# Patient Record
Sex: MALE | Race: WHITE | NOT HISPANIC OR LATINO | URBAN - METROPOLITAN AREA
[De-identification: names, ages, dates, MRNs, and addresses within clinical notes are randomized per-mention and may not be internally consistent; named-entity substitution may affect disease eponyms.]

---

## 2018-05-18 ENCOUNTER — EMERGENCY (EMERGENCY)
Facility: HOSPITAL | Age: 38
LOS: 1 days | Discharge: HOME | End: 2018-05-18
Admitting: EMERGENCY MEDICINE
Payer: MEDICAID

## 2018-05-18 VITALS
WEIGHT: 160.06 LBS | HEART RATE: 105 BPM | SYSTOLIC BLOOD PRESSURE: 144 MMHG | DIASTOLIC BLOOD PRESSURE: 82 MMHG | RESPIRATION RATE: 18 BRPM | OXYGEN SATURATION: 97 % | TEMPERATURE: 98 F

## 2018-05-18 VITALS
SYSTOLIC BLOOD PRESSURE: 136 MMHG | DIASTOLIC BLOOD PRESSURE: 82 MMHG | OXYGEN SATURATION: 97 % | HEART RATE: 98 BPM | RESPIRATION RATE: 18 BRPM
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
## 2018-05-18 DIAGNOSIS — Z79.1 LONG TERM (CURRENT) USE OF NON-STEROIDAL ANTI-INFLAMMATORIES (NSAID): ICD-10-CM
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
## 2018-05-18 DIAGNOSIS — M54.5 LOW BACK PAIN: ICD-10-CM
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
## 2018-05-18 DIAGNOSIS — S39.012A STRAIN OF MUSCLE, FASCIA AND TENDON OF LOWER BACK, INITIAL ENCOUNTER: ICD-10-CM
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
## 2018-05-18 LAB
APPEARANCE UR: CLEAR — SIGNIFICANT CHANGE UP
BILIRUB UR-MCNC: NEGATIVE — SIGNIFICANT CHANGE UP
COLOR SPEC: YELLOW — SIGNIFICANT CHANGE UP
DIFF PNL FLD: NEGATIVE — SIGNIFICANT CHANGE UP
GLUCOSE UR QL: NEGATIVE — SIGNIFICANT CHANGE UP
KETONES UR-MCNC: NEGATIVE — SIGNIFICANT CHANGE UP
LEUKOCYTE ESTERASE UR-ACNC: NEGATIVE — SIGNIFICANT CHANGE UP
NITRITE UR-MCNC: NEGATIVE — SIGNIFICANT CHANGE UP
PH UR: 7 — SIGNIFICANT CHANGE UP (ref 5–8)
PROT UR-MCNC: NEGATIVE MG/DL — SIGNIFICANT CHANGE UP
SP GR SPEC: 1.02 — SIGNIFICANT CHANGE UP (ref 1–1.03)
UROBILINOGEN FLD QL: 0.2 E.U./DL — SIGNIFICANT CHANGE UP

## 2018-05-18 PROCEDURE — 72100 X-RAY EXAM L-S SPINE 2/3 VWS: CPT | Mod: 26

## 2018-05-18 PROCEDURE — 99283 EMERGENCY DEPT VISIT LOW MDM: CPT | Mod: 25

## 2018-05-18 RX ORDER — KETOROLAC TROMETHAMINE 30 MG/ML
60 SYRINGE (ML) INJECTION ONCE
Qty: 0 | Refills: 0 | Status: DISCONTINUED | OUTPATIENT
Start: 2018-05-18 | End: 2018-05-18

## 2018-05-18 RX ORDER — DIAZEPAM 5 MG
5 TABLET ORAL ONCE
Qty: 0 | Refills: 0 | Status: DISCONTINUED | OUTPATIENT
Start: 2018-05-18 | End: 2018-05-18

## 2018-05-18 RX ORDER — IBUPROFEN 200 MG
1 TABLET ORAL
Qty: 20 | Refills: 0 | OUTPATIENT
Start: 2018-05-18 | End: 2018-06-16

## 2018-05-18 RX ADMIN — Medication 5 MILLIGRAM(S): at 20:54

## 2018-05-18 RX ADMIN — Medication 60 MILLIGRAM(S): at 20:54

## 2018-05-18 NOTE — ED ADULT NURSE NOTE - OBJECTIVE STATEMENT
pt presents to ED with back pain after lifting object. feels like a pulled muscle. denies CP, SOB, numbness/tingling. pt ambulating well.

## 2018-05-18 NOTE — ED PROVIDER NOTE - OBJECTIVE STATEMENT
39 yo M with no known PMHx, presenting c/o back pain since this morning.  pt was reaching for something while bending over and noted sudden onset of sharp pain in the lower back region, couldn't move for a few mins but slowly recovered and noted improvement in pain. Now with tightness sensation and feeling "unstable with something grinding against each other sensation." Denies fever, chills, dysuria, hematuria, flank pain, change in urinary/bowel function, numbness, tingling, focal weakness, abdominal pain, N/V/D/C, melena, hematochezia, CP, SOB, palpitations, diaphoresis, dizziness, HA, cough, rash, trauma, and fall.

## 2018-05-18 NOTE — ED PROVIDER NOTE - MEDICAL DECISION MAKING DETAILS
AFVSS at time of d/c, pt non-toxic appearing, results, ddx, and f/u plans discussed with pt at bedside, d/c'd home to f/u with PMD, strict return precautions discussed, prompt return to ER for any worsening or new sx, pt verbalized understanding.

## 2018-05-18 NOTE — ED ADULT NURSE NOTE - DISCHARGE TEACHING
Pt instructed to follow up with pcp and to do stretches as instructed my MD. Pt stable for dc at this time.

## 2018-05-18 NOTE — ED PROVIDER NOTE - PHYSICAL EXAMINATION
Vital Signs - nursing notes reviewed and confirmed  Gen - WDWN M, NAD, comfortable, speaking in full sentences   Skin - warm, dry, intact  HEENT - AT/NC, PERRL, EOMI, no conjunctival injection, moist oral mucosa, o/p clear with no erythema, edema, or exudate, uvula midline, airway patent, neck supple and NT, FROM  CV - S1S2, R/R/R  Resp - respiration non-labored, CTAB, symmetric bs b/l, no r/r/w  GI - NABS, soft, ND, NT, no rebound or guarding, no CVAT b/l   MS - w/w/p, no c/c/e, calves supple and NT, distal pulses symmetric b/l, mild paraspinal tenderness in R lumbar region, no midline tenderness, step off, crepitus, erythema, edema, ecchymosis, or deformity, neg straight leg test.   Neuro - AxOx3, no focal neuro deficits, CN II-XII grossly intact, cerebellar function intact, ambulatory without gait disturbance

## 2018-05-18 NOTE — ED ADULT NURSE NOTE - CHPI ED SYMPTOMS NEG
no anorexia/no difficulty bearing weight/no constipation/no tingling/no neck tenderness/no numbness/no motor function loss/no bowel dysfunction/no fatigue/no bladder dysfunction

## 2019-03-21 NOTE — ED PROVIDER NOTE - DIAGNOSTIC INTERPRETATION
Xray (wet reads) interpreted by MAGDALENA MCFARLANE   Xray L spine - no acute fx or subluxation, joint space intact, vertebral height preserved no

## 2021-02-16 ENCOUNTER — EMERGENCY (EMERGENCY)
Facility: HOSPITAL | Age: 41
LOS: 1 days | Discharge: HOME | End: 2021-02-16
Attending: EMERGENCY MEDICINE | Admitting: EMERGENCY MEDICINE
Payer: MEDICAID

## 2021-02-16 VITALS
DIASTOLIC BLOOD PRESSURE: 66 MMHG | HEART RATE: 110 BPM | OXYGEN SATURATION: 91 % | RESPIRATION RATE: 14 BRPM | SYSTOLIC BLOOD PRESSURE: 114 MMHG | TEMPERATURE: 98 F | HEIGHT: 68 IN | WEIGHT: 192.9 LBS

## 2021-02-16 VITALS
HEART RATE: 108 BPM | RESPIRATION RATE: 18 BRPM | SYSTOLIC BLOOD PRESSURE: 117 MMHG | DIASTOLIC BLOOD PRESSURE: 72 MMHG | OXYGEN SATURATION: 97 % | TEMPERATURE: 98 F

## 2021-02-16 PROCEDURE — 99284 EMERGENCY DEPT VISIT MOD MDM: CPT

## 2021-02-16 RX ORDER — ALBUTEROL 90 UG/1
2 AEROSOL, METERED ORAL
Qty: 1 | Refills: 0
Start: 2021-02-16

## 2021-02-16 RX ORDER — ALBUTEROL 90 UG/1
2 AEROSOL, METERED ORAL ONCE
Refills: 0 | Status: COMPLETED | OUTPATIENT
Start: 2021-02-16 | End: 2021-02-16

## 2021-02-16 RX ADMIN — ALBUTEROL 2 PUFF(S): 90 AEROSOL, METERED ORAL at 22:52

## 2021-02-16 NOTE — ED ADULT NURSE NOTE - NS ED NURSE ELOPE COMMENTS
pt reported feeling better after giving medication as ordered, Pt was instructed to wait to be re evaluated by provider.

## 2021-02-16 NOTE — ED PROVIDER NOTE - CLINICAL SUMMARY MEDICAL DECISION MAKING FREE TEXT BOX
Pt p/w wheezing and SOB with h/o asthma.  He ran out of his inhaler about 1 month ago.  Pt is in NAD.  Pt felt better after inhaler in ED and left prior to my reassessment.

## 2021-02-16 NOTE — ED PROVIDER NOTE - OBJECTIVE STATEMENT
39 y/o M with h/o asthma p/w SOB and wheezing for the last couple of days.  Pt states he ran out of his inhaler over a month ago.  Pt denies f/c/r, CP, sore throat, sick contacts.  Pt is in NAD.  Pt refused EKG at triage and also states he doesn't want to take Prednisone.  He states 1 puff of Albuterol usually helps.

## 2021-02-16 NOTE — ED PROVIDER NOTE - PROGRESS NOTE DETAILS
Pt eloped before I could reassess him. Pt had refused EKG and Prednisone.  I sent an Rx for Prednisone to his pharmacy.  Dez Rader, DO

## 2021-02-16 NOTE — ED ADULT TRIAGE NOTE - CHIEF COMPLAINT QUOTE
Patient presents to the ED complaining of asthma attack. Notes chest feeling tight and he ran out of his albuterol  inhaler.

## 2021-02-16 NOTE — ED ADULT NURSE NOTE - OBJECTIVE STATEMENT
Pt c/o asthma exasperation. Endorses chest tightness, states he ran out of his inhaler. Speaking in full sentences.

## 2021-02-20 DIAGNOSIS — R06.02 SHORTNESS OF BREATH: ICD-10-CM

## 2021-02-20 DIAGNOSIS — J45.901 UNSPECIFIED ASTHMA WITH (ACUTE) EXACERBATION: ICD-10-CM

## 2021-03-26 ENCOUNTER — EMERGENCY (EMERGENCY)
Facility: HOSPITAL | Age: 41
LOS: 1 days | Discharge: HOME | End: 2021-03-26
Attending: EMERGENCY MEDICINE | Admitting: EMERGENCY MEDICINE
Payer: MEDICAID

## 2021-03-26 VITALS
HEIGHT: 68 IN | RESPIRATION RATE: 16 BRPM | DIASTOLIC BLOOD PRESSURE: 86 MMHG | TEMPERATURE: 98 F | SYSTOLIC BLOOD PRESSURE: 139 MMHG | WEIGHT: 195.11 LBS | HEART RATE: 90 BPM | OXYGEN SATURATION: 99 %

## 2021-03-26 DIAGNOSIS — Z79.51 LONG TERM (CURRENT) USE OF INHALED STEROIDS: ICD-10-CM

## 2021-03-26 DIAGNOSIS — T17.298A OTHER FOREIGN OBJECT IN PHARYNX CAUSING OTHER INJURY, INITIAL ENCOUNTER: ICD-10-CM

## 2021-03-26 DIAGNOSIS — K20.90 ESOPHAGITIS, UNSPECIFIED WITHOUT BLEEDING: ICD-10-CM

## 2021-03-26 PROBLEM — J45.909 UNSPECIFIED ASTHMA, UNCOMPLICATED: Chronic | Status: ACTIVE | Noted: 2021-02-16

## 2021-03-26 PROCEDURE — 99283 EMERGENCY DEPT VISIT LOW MDM: CPT

## 2021-03-26 RX ORDER — SUCRALFATE 1 G
10 TABLET ORAL
Qty: 200 | Refills: 0
Start: 2021-03-26 | End: 2021-03-30

## 2021-03-26 NOTE — ED PROVIDER NOTE - CARE PROVIDER_API CALL
Claudio Mena)  [facility]  [address] [place] [id number]  Phone: [phone]  Fax: [phone]  Follow Up Time:

## 2021-03-26 NOTE — ED PROVIDER NOTE - OBJECTIVE STATEMENT
39 yo M, denies pmhx, c/o " I have something stuck in my throat" since yesterday. Patient states he was eating an orange yesterday and states he think it got stuck in his throat. 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 Patient denies drooling, difficulty handling his secretions, nausea, vomiting, changes in phonation, hoarseness.

## 2021-03-26 NOTE — ED ADULT TRIAGE NOTE - CHIEF COMPLAINT QUOTE
Pt reports possible fb to throat since yesterday morning, reports using film equipment at home to look down his throat and " I saw something white in there" c/o " fullness" +mildly painful swallowing, no resp difficulty, no excessive saliva.

## 2021-03-26 NOTE — ED PROVIDER NOTE - CLINICAL SUMMARY MEDICAL DECISION MAKING FREE TEXT BOX
ddafg patient very well appearing, signs and symptoms consistent with esophagitis. advised to take sucralfate, f/u with GI physician as discussed if symptoms don't resolve.

## 2021-03-26 NOTE — ED PROVIDER NOTE - PATIENT PORTAL LINK FT
You can access the FollowMyHealth Patient Portal offered by [facility] by registering at the following website: [email or web address]. By joining [facility]’s FollowMyHealth portal, you will also be able to view your health information using other applications (apps) compatible with our system.

## 2021-03-26 NOTE — ED PROVIDER NOTE - NSFOLLOWUPINSTRUCTIONS_ED_ALL_ED_FT
ESOPHAGITIS - AfterCare(R) Instructions(ER/ED)           Esophagitis    WHAT YOU NEED TO KNOW:    Esophagitis is inflammation or irritation of the lining of the esophagus.     Digestive Tract         DISCHARGE INSTRUCTIONS:    Call your local emergency number (911 in the [place]) for any of the following:   •You have any of the following signs of a heart attack: ?Squeezing, pressure, or pain in your chest      ?You may also have any of the following: ?Discomfort or pain in your back, neck, jaw, stomach, or arm      ?Shortness of breath      ?Nausea or vomiting      ?Lightheadedness or a sudden cold sweat            Return to the emergency department if:   •You feel like you have food stuck in your throat and you cannot cough it out.          Call your doctor if:   •You have new or worsening symptoms, even after treatment.      •You have questions or concerns about your condition or care.      Medicines:   •Medicines may be given to fight an infection or to control stomach acid.      •Take your medicine as directed. Contact your healthcare provider if you think your medicine is not helping or if you have side effects. Tell him of her if you are allergic to any medicine. Keep a list of the medicines, vitamins, and herbs you take. Include the amounts, and when and why you take them. Bring the list or the pill bottles to follow-up visits. Carry your medicine list with you in case of an emergency.      Manage or prevent esophagitis:   •Do not smoke. Nicotine and other chemicals in cigarettes and cigars can irritate and damage your esophagus. Ask your healthcare provider for information if you currently smoke and need help to quit. E-cigarettes or smokeless tobacco still contain nicotine. Talk to your healthcare provider before you use these products.       •Do not drink alcohol. Alcohol can irritate your esophagus. Talk to your healthcare provider if you need help to stop drinking.      •Limit or do not eat foods that can lead to esophagitis. Foods such as oranges and salsa can irritate your esophagus. Caffeine and chocolate can cause acid reflux. High-fat and fried foods make your stomach digest food more slowly. This increases the amount of stomach acid your esophagus is exposed to. Eat small meals, and drink water with your meals. Soft foods such as yogurt and applesauce may help soothe your throat. Do not eat for at least 3 hours before you go to bed.      •Drink more liquid when you take pills. Drink a full glass of water when you take your pills. Ask your healthcare provider if you can take your pills at least an hour before you go to bed.      •Prevent acid reflux. Do not bend over unless it is necessary. Acid may back up into your esophagus when you bend over. If possible, keep the head of your bed elevated while you sleep. This will help keep acid from backing up. Manage stress. Stress can make your symptoms worse or cause stomach acid to back up.      •Keep batteries and similar objects out of the reach of children. Babies often put items in their mouths to explore them. Button batteries are easy to swallow and can cause serious damage. Keep the battery covers of electronic devices such as remote controls taped closed. Store all batteries and toxic materials where children cannot get to them. Use childproof locks to keep children away from dangerous materials.      Follow up with your doctor as directed: Your doctor may refer you to a stomach specialist, allergist, or dietitian. You may need ongoing tests or treatment. Write down your questions so you remember to ask them during your visits.       © Copyright [employer] 2021           back to top                          © Copyright [employer] 2021

## 2021-09-08 NOTE — ED ADULT NURSE NOTE - CAS DISCH TRANSFER METHOD
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

## 2021-11-29 NOTE — ED ADULT NURSE NOTE - NS ED NURSE RECORD ANOTHER VITAL SIGN
normal... Yes Well appearing, awake, alert, oriented to person, place, time/situation and in no apparent distress.

## 2022-03-19 ENCOUNTER — EMERGENCY (EMERGENCY)
Facility: HOSPITAL | Age: 42
LOS: 1 days | Discharge: HOME | End: 2022-03-19
Attending: EMERGENCY MEDICINE | Admitting: EMERGENCY MEDICINE
Payer: MEDICAID

## 2022-03-19 VITALS
TEMPERATURE: 98 F | HEIGHT: 68 IN | SYSTOLIC BLOOD PRESSURE: 170 MMHG | DIASTOLIC BLOOD PRESSURE: 114 MMHG | HEART RATE: 121 BPM | RESPIRATION RATE: 18 BRPM | OXYGEN SATURATION: 97 %

## 2022-03-19 VITALS
RESPIRATION RATE: 20 BRPM | DIASTOLIC BLOOD PRESSURE: 96 MMHG | SYSTOLIC BLOOD PRESSURE: 157 MMHG | HEART RATE: 100 BPM | OXYGEN SATURATION: 99 %

## 2022-03-19 LAB
ALBUMIN SERPL ELPH-MCNC: 3.9 G/DL — SIGNIFICANT CHANGE UP (ref 3.4–5)
ALP SERPL-CCNC: 72 U/L — SIGNIFICANT CHANGE UP (ref 40–120)
ALT FLD-CCNC: 26 U/L — SIGNIFICANT CHANGE UP (ref 12–42)
ANION GAP SERPL CALC-SCNC: 6 MMOL/L — LOW (ref 9–16)
AST SERPL-CCNC: 30 U/L — SIGNIFICANT CHANGE UP (ref 15–37)
BILIRUB SERPL-MCNC: 0.7 MG/DL — SIGNIFICANT CHANGE UP (ref 0.2–1.2)
BUN SERPL-MCNC: 16 MG/DL — SIGNIFICANT CHANGE UP (ref 7–23)
CALCIUM SERPL-MCNC: 9.6 MG/DL — SIGNIFICANT CHANGE UP (ref 8.5–10.5)
CHLORIDE SERPL-SCNC: 103 MMOL/L — SIGNIFICANT CHANGE UP (ref 96–108)
CO2 SERPL-SCNC: 28 MMOL/L — SIGNIFICANT CHANGE UP (ref 22–31)
CREAT SERPL-MCNC: 1.07 MG/DL — SIGNIFICANT CHANGE UP (ref 0.5–1.3)
EGFR: 89 ML/MIN/1.73M2 — SIGNIFICANT CHANGE UP
GLUCOSE SERPL-MCNC: 109 MG/DL — HIGH (ref 70–99)
HCT VFR BLD CALC: 45.2 % — SIGNIFICANT CHANGE UP (ref 39–50)
HGB BLD-MCNC: 15.6 G/DL — SIGNIFICANT CHANGE UP (ref 13–17)
MAGNESIUM SERPL-MCNC: 1.8 MG/DL — SIGNIFICANT CHANGE UP (ref 1.6–2.6)
MCHC RBC-ENTMCNC: 31.6 PG — SIGNIFICANT CHANGE UP (ref 27–34)
MCHC RBC-ENTMCNC: 34.5 GM/DL — SIGNIFICANT CHANGE UP (ref 32–36)
MCV RBC AUTO: 91.5 FL — SIGNIFICANT CHANGE UP (ref 80–100)
NRBC # BLD: 0 /100 WBCS — SIGNIFICANT CHANGE UP (ref 0–0)
PLATELET # BLD AUTO: 219 K/UL — SIGNIFICANT CHANGE UP (ref 150–400)
POTASSIUM SERPL-MCNC: 4.2 MMOL/L — SIGNIFICANT CHANGE UP (ref 3.5–5.3)
POTASSIUM SERPL-SCNC: 4.2 MMOL/L — SIGNIFICANT CHANGE UP (ref 3.5–5.3)
PROT SERPL-MCNC: 7.6 G/DL — SIGNIFICANT CHANGE UP (ref 6.4–8.2)
RBC # BLD: 4.94 M/UL — SIGNIFICANT CHANGE UP (ref 4.2–5.8)
RBC # FLD: 12.2 % — SIGNIFICANT CHANGE UP (ref 10.3–14.5)
SODIUM SERPL-SCNC: 137 MMOL/L — SIGNIFICANT CHANGE UP (ref 132–145)
WBC # BLD: 5.4 K/UL — SIGNIFICANT CHANGE UP (ref 3.8–10.5)
WBC # FLD AUTO: 5.4 K/UL — SIGNIFICANT CHANGE UP (ref 3.8–10.5)

## 2022-03-19 PROCEDURE — 99284 EMERGENCY DEPT VISIT MOD MDM: CPT

## 2022-03-19 PROCEDURE — 93010 ELECTROCARDIOGRAM REPORT: CPT

## 2022-03-19 RX ORDER — SODIUM CHLORIDE 9 MG/ML
1000 INJECTION INTRAMUSCULAR; INTRAVENOUS; SUBCUTANEOUS ONCE
Refills: 0 | Status: COMPLETED | OUTPATIENT
Start: 2022-03-19 | End: 2022-03-19

## 2022-03-19 RX ADMIN — SODIUM CHLORIDE 1000 MILLILITER(S): 9 INJECTION INTRAMUSCULAR; INTRAVENOUS; SUBCUTANEOUS at 18:28

## 2022-03-19 NOTE — ED PROVIDER NOTE - NS ED ROS FT
General:  No fever, no chills.  Cardiovascular: + Irregular Heartbeat  Musculoskeletal:  +Left leg feels cold  Endocrine:  No generalized weakness, no polyuria.  Neurological:  No headache, no focal weakness.   Psychiatric: No emotional stress, no depression.

## 2022-03-19 NOTE — ED ADULT NURSE NOTE - OBJECTIVE STATEMENT
Pt c/o irregular heart beat x 1 years.  He states he get an alert from his apple watch. Pt states he has never seen a cardiologist. Denies any acute chest pain, dizziness, or shortness of breath

## 2022-03-19 NOTE — ED PROVIDER NOTE - CARE PROVIDER_API CALL
Roberth Penny)  Cardiovascular Disease  [address] [facility] [place] [address]  Phone: [phone]  Fax: [phone]  Follow Up Time: 7-10 Days  
patient

## 2022-03-19 NOTE — ED PROVIDER NOTE - PATIENT PORTAL LINK FT
You can access the FollowMyHealth Patient Portal offered by [facility] by registering at the following website: [email or web address]. By joining [employer]’s FollowMyHealth portal, you will also be able to view your health information using other applications (apps) compatible with our system.

## 2022-03-19 NOTE — ED ADULT TRIAGE NOTE - AS HEIGHT TYPE
stated Sski Pregnancy And Lactation Text: This medication is Pregnancy Category D and isn't considered safe during pregnancy. It is excreted in breast milk.

## 2022-03-19 NOTE — ED ADULT TRIAGE NOTE - CHIEF COMPLAINT QUOTE
pt. c/o palpitations. Reports possible a-fibx1 year but has never seen a cardiologist, not on medications

## 2022-03-19 NOTE — ED PROVIDER NOTE - PHYSICAL EXAMINATION
VITAL SIGNS: I have reviewed nursing notes and confirm.  CONSTITUTIONAL: Well-developed; well-nourished; in no acute distress.  SKIN: Normal color for race. +Feet warm to touch.   EYES: Bilaterally clear.  ENT: Airway patent.   NECK: Supple; non tender.  CARD: Equal and strong DP pulses bilaterally.  NEURO: Alert, oriented. Grossly unremarkable.  PSYCH: Cooperative, appropriate.

## 2022-03-22 DIAGNOSIS — J45.909 UNSPECIFIED ASTHMA, UNCOMPLICATED: ICD-10-CM

## 2022-03-22 DIAGNOSIS — R00.2 PALPITATIONS: ICD-10-CM

## 2022-03-22 DIAGNOSIS — R00.0 TACHYCARDIA, UNSPECIFIED: ICD-10-CM

## 2023-07-22 NOTE — ED PROVIDER NOTE - OBJECTIVE STATEMENT
Transaminitis 40 y/o M presents to ED with c/o irregular heartbeat x1 year. Patient reports that he began experience these irregular heartbeat around the time he took the second Covid vaccine. He states that his heart pauses for a second and then he feels a sudden pull. He presented reports from his [employer] heart monitor that stated he shows signs of AFib. Patient states he "feels like his heart is working extra hard at times" but states the sensation is never painful and even feels good sometimes. He also reports that his left leg always feels cold. Patient has been taking Adderall for the past 13 years for ADHD. Hasn't consulted with any cardiologist.